# Patient Record
Sex: MALE | URBAN - METROPOLITAN AREA
[De-identification: names, ages, dates, MRNs, and addresses within clinical notes are randomized per-mention and may not be internally consistent; named-entity substitution may affect disease eponyms.]

---

## 2023-10-23 ENCOUNTER — APPOINTMENT (RX ONLY)
Dept: URBAN - METROPOLITAN AREA CLINIC 176 | Facility: CLINIC | Age: 28
Setting detail: DERMATOLOGY
End: 2023-10-23

## 2023-10-23 DIAGNOSIS — Z41.9 ENCOUNTER FOR PROCEDURE FOR PURPOSES OTHER THAN REMEDYING HEALTH STATE, UNSPECIFIED: ICD-10-CM

## 2023-10-23 PROCEDURE — ? HYDRAFACIAL

## 2023-10-23 ASSESSMENT — LOCATION DETAILED DESCRIPTION DERM: LOCATION DETAILED: LEFT MEDIAL FOREHEAD

## 2023-10-23 ASSESSMENT — LOCATION SIMPLE DESCRIPTION DERM: LOCATION SIMPLE: LEFT FOREHEAD

## 2023-10-23 ASSESSMENT — LOCATION ZONE DERM: LOCATION ZONE: FACE

## 2023-10-23 NOTE — PROCEDURE: HYDRAFACIAL
Pt is  and was at work was loading a \"metal drum\" at work and one slid out hitting him in the head. Pt denies LOC, bleeding controlled, pt has slight L sided neck pain.   
Vacuum Pressure Low Setting (Will Not Render If Set To 0): 2
Location Override: Face
Vacuum Pressure Low Setting (Will Not Render If Set To 0): 12
Tip: Hydropeel Tip, Clear
Tip Override
Treatment Number: 1
Procedure: Boost
Price (Use Numbers Only, No Special Characters Or $): 175
Solution: Beta-HD
Comments: Patient here for Hydrafacial.  Wife has treatment with us.  Patient does not use anything on his skin.  HAs fair skin and dark hair.  Does have heavy congestion in [place] and around eyes/upper cheeks. Would like to work on this and will see what products his wife has at home.  Suggested ZO-gentle, oil pads, polish.  \\n\\nHF:\\nCleansed x2\\nTreat\\nExtractions\\nTreat\\n*did not want SPF
Tip: Hydropeel Tip, Teal
Procedure: Extend and Protect
Procedure: Peel
Vacuum Pressure High Setting (Will Not Render If Set To 0): 0
Consent: Written consent obtained, risks reviewed including but not limited to crusting, scabbing, blistering, scarring, darker or lighter pigmentary change, bruising, and/or incomplete response.
Tip: Hydropeel Tip, Blue
Indication: anti-aging
Solution Override
Procedure: Exfoliation
Procedure: Fusion
Post-Care Instructions: I reviewed with the patient in detail post-care instructions. Patient should stay away from the sun and wear sun protection until treated areas are fully healed.
Vacuum Pressure High Setting (Will Not Render If Set To 0): 14
Procedure: Extraction
Solution: Activ-4